# Patient Record
(demographics unavailable — no encounter records)

---

## 2024-11-26 NOTE — HISTORY OF PRESENT ILLNESS
[de-identified] :  Patient is a RHD male, reports having neck pain/tightness, increased with ROM to left. Patient B hand numbness in digits 2 and 3. Patient also with Right shoulder pathology having rotator cuff and labral tear- patient with recent surgery in Feb 2023. Patient denies change in dexterity or fine motor skills.  Patient also with low back pain/tightness, has completed course of PT now transitioned to HEP daily with significant relief. Patient reports having B knee pain, reports foot numbness. Patient reports having flares of back pain due to physical activity, improves with rest, and stretching. Patient does not currently take any medication for pain. Denies change in b/b function.   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 Has been going to PT..  Back and neck pain has improved.  slightly with HEP.  But still persistent. Feels that his neck still having intermittent locking episodes.  Back has gone out less since starting the exercises.   2/6/24  Here today for follow-up.  neck and low back  has been slightly improved with doing HEP.    5/14/24  Low back pain with BLLE  Radic to posterior thigh posterior calf down to right foot.  R/L radic.   Tingling in same distribution.  No bowel or bladder symptoms.    pain and stiffness in neck radiating to right 4th and 5th digit intermittently.   7/9/24  Here for followup. Pain is worsening in neck.  Not approved for PT.   9/5/23 WC Patient reports initial injury occurred in 2015, then worsened after another work related injury 8/19/21 when he was trying to prevent ladder from falling.   11/26/24: Follow up for the cervical and lumbar spine. Patient continues Home exercises regimen for management of symptoms. Reports flare up of symptoms has lessened. Taking turmeric as needed and using heating pads.

## 2024-11-26 NOTE — ASSESSMENT
[FreeTextEntry1] : 57 M with neck and low back pain.   Neck pain has worsened.  still not approved for PT  Less exacerbations since starting HEP and doing PT would benefit from formal PT OTCS NSAIDS PRN FU 8 weeks   Please let this note serve as a letter of medical necessity for continued physical therapy.  The patient lacks motion and strength and would benefit from continued formal physical therapy.

## 2024-11-26 NOTE — WORK
[Was the competent medical cause of the injury] : was the competent medical cause of the injury [Are consistent with the injury] : are consistent with the injury [Has the patient reached Maximum Medical Improvement? If yes, indicate date___] : Yes, on [unfilled] [] : Frequently [Sedentary Work] : sedentary work [de-identified] : Cervical Spine [de-identified] : 11.1 [de-identified] : A [de-identified] : Lumbar Spine [de-identified] : 11.1 [de-identified] : A

## 2024-11-26 NOTE — IMAGING
[de-identified] : X-ray Ap/Lateral of cervical spine were viewed and interpreted.  Normal alignment is maintained without any spondylolisthesis. C5-6 autofusion X-ray Ap/Lateral of lumbar spine were viewed and interpreted.  Normal alignment is maintained without any spondylolisthesis. L4-5 adn L5-s1 dsic height loss

## 2024-12-05 NOTE — HISTORY OF PRESENT ILLNESS
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  2/15/23 f/u R hip, pain is about the same, increased pain with internal rotation 11/9/22 f/u R hip, pain was worse a few days ago but has settled down over the last few days, still primarily complaining of groin pain on that side and lateral hip pain on the left.  56M injury L hip at work, while getting off ladder that was falling 8/19/21, he overstretched and twisted trying to stop it from falling. He p/w flare up of lbp, acute, sharp in nature since he bent over 10/22/22 to tie his shoe.  Also c/o lateral L hip pain.  Aggravated by siiting for long car ride. Pain is constant. Radiates buttocks and down b/l legs. + buckling sensation as he walks. No loss or b/b control. Advil prn. Heis doing chirocare. PT in the past with good response, but, has not been authorized x 2 months. Warm bath is helpful.  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  His back pain is followed  by  Dr. Tanner  [] : no [FreeTextEntry1] : rt hip  [FreeTextEntry3] : 8-19-21 [FreeTextEntry6] : cannot feel rt foot  [FreeTextEntry7] : back pain as well , b knee pain [FreeTextEntry9] : HEP at gym 3 times a week  [de-identified] : being on an incline

## 2024-12-05 NOTE — REVIEW OF SYSTEMS
[Joint Pain] : joint pain [Negative] : Heme/Lymph [Joint Stiffness] : joint stiffness [FreeTextEntry9] : rt hip

## 2024-12-05 NOTE — ASSESSMENT
[FreeTextEntry1] : 57M p/w R hip labrum tear, L abductor tendonitis  SLU complete 25%  discussed eventual R GUILLERMINA if groin symptoms worsen, patient thought initial injury was a groin pull at the time of injury but his labrum tear is more than likely related to his fall at work.

## 2024-12-05 NOTE — WORK
[Has the patient reached Maximum Medical Improvement? If yes, indicate date___] : Yes, on [unfilled] [Is there permanent partial impairment?] : Yes [Right] : right [FreeTextEntry6] : right hip [FreeTextEntry7] : hip [FreeTextEntry8] : flex 90  abd 35 add 25 IR 35 ER 45 [FreeTextEntry5] : 25

## 2024-12-05 NOTE — PHYSICAL EXAM
[Bilateral] : hip bilaterally [] : no pain with hip rotation [TWNoteComboBox7] : flexion 100 degrees [de-identified] : external rotation 40 degrees [TWNoteComboBox6] : internal rotation 40 degrees

## 2024-12-13 NOTE — DISCUSSION/SUMMARY
[de-identified] : Patient allowed to gently start resuming activities. Discussed change to medication prescription and usage. Bracing options discussed with patient. Activity modification as needed Discussed poss future surgery,  try topical lidocaine I gave him 17.5% loss of use Left knee which is fully attributed to this mos recent case in the fall of 2018
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
## 2024-12-13 NOTE — REVIEW OF SYSTEMS
[Joint Pain] : joint pain [Negative] : Heme/Lymph [Joint Stiffness] : joint stiffness [Joint Swelling] : joint swelling [Muscle Weakness] : muscle weakness

## 2024-12-13 NOTE — PHYSICAL EXAM
[Left] : left knee [NL (0)] : extension 0 degrees [5___] : hamstring 5[unfilled]/5 [Negative] : negative Javier's [] : mildly antalgic [TWNoteComboBox7] : flexion 120 degrees

## 2024-12-20 NOTE — PHYSICAL EXAM
[Right] : right shoulder [5___] : external rotation 5[unfilled]/5 [] : motor and sensory intact distally [TWNoteComboBox7] : active forward flexion 180 degrees [TWNoteComboBox6] : internal rotation 35 degrees [de-identified] : external rotation 45 degrees

## 2024-12-20 NOTE — WORK
[Total (100%)] : total (100%) [Does not reveal pre-existing condition(s) that may affect treatment/prognosis] : does not reveal pre-existing condition(s) that may affect treatment/prognosis [Cannot return to work because ________] : cannot return to work because [unfilled] [Patient] : patient [No Rx restrictions] : No Rx restrictions. [I provided the services listed above] :  I provided the services listed above. [FreeTextEntry1] : poor [Is there permanent partial impairment?] : Yes [Right] : right [FreeTextEntry7] : shoulder [FreeTextEntry8] : int rot 35 deg, 45 deg ext rot [de-identified] : full [de-identified] : distal clav exc [FreeTextEntry5] : 25 [de-identified] : 15% for rom dec measured 3 times with gonio and 10 % for distal clav exc [de-identified] : knee [de-identified] : 120 flex [de-identified] : full [de-identified] : chondromalacia [de-identified] : 20 [de-identified] : 10% for motion measured 3 times with gonio and 10% for chondromalcia

## 2024-12-20 NOTE — DISCUSSION/SUMMARY
[de-identified] : he does not  desire R knee med meniscectomy and may need surgery repair of infraspinatus in future ice mod activity cont HEP

## 2024-12-20 NOTE — HISTORY OF PRESENT ILLNESS
[Work related] : work related [Sudden] : sudden [Burning] : burning [Radiating] : radiating [Nothing helps with pain getting better] : Nothing helps with pain getting better [Sitting] : sitting [de-identified] : had R shoulder RCR SS and labral repair and SAD 2/14/23 and was in PT, which helped and new MRA now showed high grade partial tear infraspinatus and has some soreness and has known R knee med meniscus tear does not want surgery right now for that   [6] : 6 [4] : 4 [Localized] : localized [Household chores] : household chores [Leisure] : leisure [Physical therapy] : physical therapy [] : yes [FreeTextEntry1] : rt shoulder [FreeTextEntry3] : 8-19-21 [FreeTextEntry6] : redness [FreeTextEntry7] : neck [FreeTextEntry9] : HEP

## 2025-01-23 NOTE — ASSESSMENT
[FreeTextEntry1] : 57M p/w R hip labrum tear, L abductor tendonitis  SLU complete 25%  discussed eventual R GUILLERMINA if groin symptoms worsen, patient thought initial injury was a groin pull at the time of injury but his labrum tear is more than likely related to his fall at work.  PT R hip requested, not clear why auth has not been approved May benefit from R IA CSI

## 2025-01-23 NOTE — PHYSICAL EXAM
[Bilateral] : hip bilaterally [] : no pain with hip rotation [TWNoteComboBox7] : flexion 100 degrees [de-identified] : external rotation 40 degrees [TWNoteComboBox6] : internal rotation 40 degrees

## 2025-01-23 NOTE — HISTORY OF PRESENT ILLNESS
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 PT never authorized. Trying to manage with HEP, turmeric. 12/5/24 f/u R hip continued groin pain, some days very severe, constant stiffness and pulling 11/1/23 f/u right hip, continued pain, considering treatment options 10/26/23 f/u R hip,  5/18/23: Here for f/up R hip.  2/15/23 f/u R hip, pain is about the same, increased pain with internal rotation 11/9/22 f/u R hip, pain was worse a few days ago but has settled down over the last few days, still primarily complaining of groin pain on that side and lateral hip pain on the left.  56M injury L hip at work, while getting off ladder that was falling 8/19/21, he overstretched and twisted trying to stop it from falling. He p/w flare up of lbp, acute, sharp in nature since he bent over 10/22/22 to tie his shoe.  Also c/o lateral L hip pain.  Aggravated by siiting for long car ride. Pain is constant. Radiates buttocks and down b/l legs. + buckling sensation as he walks. No loss or b/b control. Advil prn. Heis doing chirocare. PT in the past with good response, but, has not been authorized x 2 months. Warm bath is helpful.  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  His back pain is followed  by  Dr. Tanner  [] : no [FreeTextEntry1] : rt hip  [FreeTextEntry3] : 8-19-21 [FreeTextEntry7] : groin [FreeTextEntry9] : HEP at gym 3 times a week  [de-identified] : being on an incline  [de-identified] : PT denied by WC, pain persists

## 2025-04-25 NOTE — HISTORY OF PRESENT ILLNESS
[Work related] : work related [Sudden] : sudden [Localized] : localized [Shooting] : shooting [Stabbing] : stabbing [de-identified] : had R shoulder RCR SS and labral repair and SAD 2/14/23 and was in PT, which helped and new MRA now showed high grade partial tear infraspinatus and has some soreness and has known R knee med meniscus tear does not want surgery right now for that , the shoulder feels worse  [6] : 6 [4] : 4 [Burning] : burning [Radiating] : radiating [Household chores] : household chores [Leisure] : leisure [Physical therapy] : physical therapy [Nothing helps with pain getting better] : Nothing helps with pain getting better [Sitting] : sitting [] : yes [FreeTextEntry1] : rt shoulder [FreeTextEntry3] : 8-19-21 [FreeTextEntry6] : redness [FreeTextEntry7] : neck [FreeTextEntry9] : HEP

## 2025-04-25 NOTE — WORK
[Total (100%)] : total (100%) [Does not reveal pre-existing condition(s) that may affect treatment/prognosis] : does not reveal pre-existing condition(s) that may affect treatment/prognosis [Cannot return to work because ________] : cannot return to work because [unfilled] [Patient] : patient [No Rx restrictions] : No Rx restrictions. [I provided the services listed above] :  I provided the services listed above. [FreeTextEntry1] : fair needs surgery

## 2025-04-25 NOTE — PHYSICAL EXAM
[Right] : right shoulder [5___] : external rotation 5[unfilled]/5 [] : motor and sensory intact distally [TWNoteComboBox7] : active forward flexion 180 degrees [TWNoteComboBox6] : internal rotation 35 degrees [de-identified] : external rotation 45 degrees

## 2025-04-25 NOTE — DISCUSSION/SUMMARY
[de-identified] : he has more pain and new high grade tear infraspinatus and would benefit from surgerymod activity cont HEP request comp auth to re-open the case for active treatment C-27